# Patient Record
Sex: MALE | Race: WHITE | Employment: FULL TIME | URBAN - METROPOLITAN AREA
[De-identification: names, ages, dates, MRNs, and addresses within clinical notes are randomized per-mention and may not be internally consistent; named-entity substitution may affect disease eponyms.]

---

## 2018-12-15 ENCOUNTER — HOSPITAL ENCOUNTER (EMERGENCY)
Age: 33
Discharge: HOME | End: 2018-12-15
Attending: EMERGENCY MEDICINE
Payer: COMMERCIAL

## 2018-12-15 VITALS
HEART RATE: 88 BPM | OXYGEN SATURATION: 98 % | BODY MASS INDEX: 34.6 KG/M2 | DIASTOLIC BLOOD PRESSURE: 74 MMHG | HEIGHT: 69 IN | TEMPERATURE: 98.2 F | WEIGHT: 233.6 LBS | SYSTOLIC BLOOD PRESSURE: 141 MMHG | RESPIRATION RATE: 12 BRPM

## 2018-12-15 DIAGNOSIS — J02.9 PHARYNGITIS, UNSPECIFIED ETIOLOGY: Primary | ICD-10-CM

## 2018-12-15 LAB — S PYO AG THROAT QL: NEGATIVE

## 2018-12-15 PROCEDURE — 87081 CULTURE SCREEN ONLY: CPT

## 2018-12-15 PROCEDURE — 99283 EMERGENCY DEPT VISIT LOW MDM: CPT

## 2018-12-15 PROCEDURE — 87880 STREP A ASSAY W/OPTIC: CPT

## 2018-12-15 RX ORDER — METHYLPREDNISOLONE 4 MG/1
TABLET ORAL
Qty: 1 KIT | Refills: 0 | Status: SHIPPED | OUTPATIENT
Start: 2018-12-15 | End: 2018-12-21

## 2018-12-15 ASSESSMENT — PAIN DESCRIPTION - PAIN TYPE: TYPE: ACUTE PAIN

## 2018-12-15 ASSESSMENT — PAIN SCALES - GENERAL
PAINLEVEL_OUTOF10: 6
PAINLEVEL_OUTOF10: 7

## 2018-12-15 ASSESSMENT — PAIN DESCRIPTION - DESCRIPTORS
DESCRIPTORS: ACHING
DESCRIPTORS: ACHING

## 2018-12-15 ASSESSMENT — PAIN DESCRIPTION - LOCATION
LOCATION: THROAT
LOCATION: THROAT

## 2018-12-15 NOTE — ED PROVIDER NOTES
CHIEF COMPLAINT  Pharyngitis      HISTORY OF PRESENT ILLNESS  Rosemary Garg is a 28 y.o. male, who presents to the ED with 3 week history of cough and chest congestion sore throat voice change earache occasional wheezing no chest pain no fever. Patient is concerned because his father had similar symptoms and was diagnosed with throat cancer. Review of Systems    I have reviewed the following from the nursing documentation. History reviewed. No pertinent past medical history. History reviewed. No pertinent surgical history. History reviewed. No pertinent family history. Social History     Social History    Marital status: Single     Spouse name: N/A    Number of children: N/A    Years of education: N/A     Occupational History    Not on file. Social History Main Topics    Smoking status: Current Every Day Smoker    Smokeless tobacco: Never Used    Alcohol use Yes      Comment: social    Drug use: No    Sexual activity: Not on file     Other Topics Concern    Not on file     Social History Narrative    No narrative on file     No current facility-administered medications for this encounter. Current Outpatient Prescriptions   Medication Sig Dispense Refill    methylPREDNISolone (MEDROL, FREDERICK,) 4 MG tablet Take by mouth. 1 kit 0    naproxen (NAPROSYN) 375 MG tablet Take 1 tablet by mouth 2 times daily 20 tablet 0     No Known Allergies  Review of Systems       PHYSICAL EXAM  BP (!) 141/74   Pulse 88   Temp 98.2 °F (36.8 °C) (Oral)   Resp 12   Ht 5' 9\" (1.753 m)   Wt 106 kg (233 lb 9.6 oz)   SpO2 98%   BMI 34.50 kg/m²   GENERAL APPEARANCE: Awake and alert. Cooperative. In no acute distress. EYES: PERRL. Corneas clear. Sclera non icteric. No conjunctival injection  ENT: Oropharynx pharyngeal erythema and tonsillar hypertrophy no exudates. Airway patent. No stridor. No asymmetry. NECK: Supple bilateral anterior cervical adenopathy  LUNGS: Clear.  Equal breath sounds bilaterally. CARDIOVASCULAR: RRR. No murmurs rubs or gallops. EXTREMITIES:  Moves all extremities equally. SKIN: Warm and dry. LABORATORY STUDIES:   Labs Reviewed   STREP SCREEN GROUP A THROAT    Narrative:     Performed at:  [facility]  [facility] [address],  [facility], [address]   Phone [phone]   CULTURE BETA STREP CONFIRM PLATE        RADIOLOGY  No orders to display       PROCEDURES  Procedures    EDCOURSE/MDM  Patient seen and evaluated. Old records reviewed if pertinent. Labs and imaging reviewed andresults discussed with patient. I considered pharyngitis, bronchitis. If Raegan Carson is discharged, I discussed with Raegan Carson and/or family the exam results, diagnosis, care, prognosis, reasons to return and the importance of follow up. Patient and/or family isin agreement with plan and all questions have been answered. Specific discharge instructions explained, including reasons to return to the emergency department. If discharged, patient was given scripts for the following medications. Discharge Medication List as of 12/15/2018  6:34 PM      START taking these medications    Details   methylPREDNISolone (MEDROL, FREDERICK,) 4 MG tablet Take by mouth., Disp-1 kit, R-0Print             CLINICAL IMPRESSION  1. Pharyngitis, unspecified etiology        Blood pressure (!) 141/74, pulse 88, temperature 98.2 °F (36.8 °C), temperature source Oral, resp. rate 12, height 5' 9\" (1.753 m), weight 106 kg (233 lb 9.6 oz), SpO2 98 %. DISPOSITION  Raegan Carson was discharged in stable condition.                    Megan Hughes MD  12/15/18 [id number]

## 2018-12-15 NOTE — ED NOTES
Patient given prescription, discharge instructions verbal and written, patient verbalized understanding. Alert/oriented X4, Clear speech.   Patient exhibits no distress, ambulates with steady gait per self leaving unit, no further request.     Steve Sellers RN  12/15/18 [id number]

## 2018-12-17 LAB
ORGANISM: ABNORMAL
S PYO THROAT QL CULT: ABNORMAL
S PYO THROAT QL CULT: ABNORMAL

## 2024-09-08 ENCOUNTER — HOSPITAL ENCOUNTER (EMERGENCY)
Age: 39
Discharge: HOME | End: 2024-09-08
Attending: EMERGENCY MEDICINE

## 2024-09-08 VITALS
BODY MASS INDEX: 28 KG/M2 | RESPIRATION RATE: 12 BRPM | WEIGHT: 189.6 LBS | DIASTOLIC BLOOD PRESSURE: 81 MMHG | SYSTOLIC BLOOD PRESSURE: 134 MMHG | HEART RATE: 78 BPM | OXYGEN SATURATION: 100 % | TEMPERATURE: 98.6 F

## 2024-09-08 DIAGNOSIS — S86.911A STRAIN OF RIGHT KNEE, INITIAL ENCOUNTER: Primary | ICD-10-CM

## 2024-09-08 PROCEDURE — 99282 EMERGENCY DEPT VISIT SF MDM: CPT

## 2024-09-08 ASSESSMENT — PAIN DESCRIPTION - ONSET: ONSET: ON-GOING

## 2024-09-08 ASSESSMENT — PAIN - FUNCTIONAL ASSESSMENT
PAIN_FUNCTIONAL_ASSESSMENT: PREVENTS OR INTERFERES SOME ACTIVE ACTIVITIES AND ADLS
PAIN_FUNCTIONAL_ASSESSMENT: 0-10

## 2024-09-08 ASSESSMENT — PAIN DESCRIPTION - PAIN TYPE: TYPE: ACUTE PAIN

## 2024-09-08 ASSESSMENT — PAIN DESCRIPTION - ORIENTATION: ORIENTATION: RIGHT

## 2024-09-08 ASSESSMENT — PAIN DESCRIPTION - DESCRIPTORS: DESCRIPTORS: SHARP;THROBBING

## 2024-09-08 ASSESSMENT — PAIN SCALES - GENERAL: PAINLEVEL_OUTOF10: 10

## 2024-09-08 ASSESSMENT — PAIN DESCRIPTION - LOCATION: LOCATION: KNEE

## 2024-09-08 ASSESSMENT — PAIN DESCRIPTION - FREQUENCY: FREQUENCY: INTERMITTENT
